# Patient Record
Sex: FEMALE | Race: WHITE | Employment: UNEMPLOYED | URBAN - METROPOLITAN AREA
[De-identification: names, ages, dates, MRNs, and addresses within clinical notes are randomized per-mention and may not be internally consistent; named-entity substitution may affect disease eponyms.]

---

## 2022-02-22 ENCOUNTER — TELEPHONE (OUTPATIENT)
Dept: ORTHOPEDIC SURGERY | Age: 32
End: 2022-02-22

## 2022-04-15 ENCOUNTER — ANESTHESIA EVENT (OUTPATIENT)
Dept: SURGERY | Age: 32
End: 2022-04-15
Payer: MEDICAID

## 2022-04-15 NOTE — PERIOP NOTES
Unable to get a hold of pt. The number on file and with [address] [place] [address] is a [address] of business and the woman who answered said no one here by that name. [address] [place] [address] is aware and a message is at the office to let the pt know she should arrive at 11:45 am if she happens to call the office.

## 2022-04-18 ENCOUNTER — HOSPITAL ENCOUNTER (OUTPATIENT)
Age: 32
Setting detail: OUTPATIENT SURGERY
Discharge: HOME | End: 2022-04-18
Attending: ORTHOPAEDIC SURGERY | Admitting: ORTHOPAEDIC SURGERY
Payer: MEDICAID

## 2022-04-18 ENCOUNTER — ANESTHESIA (OUTPATIENT)
Dept: SURGERY | Age: 32
End: 2022-04-18
Payer: MEDICAID

## 2022-04-18 VITALS
BODY MASS INDEX: 19.21 KG/M2 | OXYGEN SATURATION: 99 % | TEMPERATURE: 97.9 F | HEART RATE: 78 BPM | DIASTOLIC BLOOD PRESSURE: 60 MMHG | WEIGHT: 115.3 LBS | RESPIRATION RATE: 15 BRPM | HEIGHT: 65 IN | SYSTOLIC BLOOD PRESSURE: 105 MMHG

## 2022-04-18 DIAGNOSIS — I99.9 COCAINE-INDUCED VASCULAR DISORDER (HCC): ICD-10-CM

## 2022-04-18 DIAGNOSIS — I99.8 ISCHEMIC FINGER: Primary | ICD-10-CM

## 2022-04-18 DIAGNOSIS — F14.988 COCAINE-INDUCED VASCULAR DISORDER (HCC): ICD-10-CM

## 2022-04-18 LAB — HCG UR QL: NEGATIVE

## 2022-04-18 PROCEDURE — 76030000001 HC AMB SURG OR TIME 1 TO 1.5: Performed by: ORTHOPAEDIC SURGERY

## 2022-04-18 PROCEDURE — 77030003601 HC NDL NRV BLK BBMI -A

## 2022-04-18 PROCEDURE — 74011000250 HC RX REV CODE- 250: Performed by: NURSE ANESTHETIST, CERTIFIED REGISTERED

## 2022-04-18 PROCEDURE — 2709999900 HC NON-CHARGEABLE SUPPLY: Performed by: ORTHOPAEDIC SURGERY

## 2022-04-18 PROCEDURE — 76060000062 HC AMB SURG ANES 1 TO 1.5 HR: Performed by: ORTHOPAEDIC SURGERY

## 2022-04-18 PROCEDURE — 74011250636 HC RX REV CODE- 250/636: Performed by: NURSE ANESTHETIST, CERTIFIED REGISTERED

## 2022-04-18 PROCEDURE — 76210000050 HC AMBSU PH II REC 0.5 TO 1 HR: Performed by: ORTHOPAEDIC SURGERY

## 2022-04-18 PROCEDURE — 81025 URINE PREGNANCY TEST: CPT

## 2022-04-18 PROCEDURE — 77030040922 HC BLNKT HYPOTHRM STRY -A

## 2022-04-18 PROCEDURE — 74011250636 HC RX REV CODE- 250/636: Performed by: ANESTHESIOLOGY

## 2022-04-18 PROCEDURE — 77030002916 HC SUT ETHLN J&J -A: Performed by: ORTHOPAEDIC SURGERY

## 2022-04-18 RX ORDER — HYDROMORPHONE HYDROCHLORIDE 1 MG/ML
0.5 INJECTION, SOLUTION INTRAMUSCULAR; INTRAVENOUS; SUBCUTANEOUS
Status: DISCONTINUED | OUTPATIENT
Start: 2022-04-18 | End: 2022-04-18 | Stop reason: HOSPADM

## 2022-04-18 RX ORDER — DEXMEDETOMIDINE HYDROCHLORIDE 100 UG/ML
INJECTION, SOLUTION INTRAVENOUS AS NEEDED
Status: DISCONTINUED | OUTPATIENT
Start: 2022-04-18 | End: 2022-04-18 | Stop reason: HOSPADM

## 2022-04-18 RX ORDER — GLYCOPYRROLATE 0.2 MG/ML
INJECTION INTRAMUSCULAR; INTRAVENOUS AS NEEDED
Status: DISCONTINUED | OUTPATIENT
Start: 2022-04-18 | End: 2022-04-18 | Stop reason: HOSPADM

## 2022-04-18 RX ORDER — ASPIRIN 81 MG/1
81 TABLET ORAL DAILY
COMMUNITY

## 2022-04-18 RX ORDER — ONDANSETRON 2 MG/ML
4 INJECTION INTRAMUSCULAR; INTRAVENOUS AS NEEDED
Status: DISCONTINUED | OUTPATIENT
Start: 2022-04-18 | End: 2022-04-18 | Stop reason: HOSPADM

## 2022-04-18 RX ORDER — SODIUM CHLORIDE, SODIUM LACTATE, POTASSIUM CHLORIDE, CALCIUM CHLORIDE 600; 310; 30; 20 MG/100ML; MG/100ML; MG/100ML; MG/100ML
150 INJECTION, SOLUTION INTRAVENOUS CONTINUOUS
Status: DISCONTINUED | OUTPATIENT
Start: 2022-04-18 | End: 2022-04-18 | Stop reason: HOSPADM

## 2022-04-18 RX ORDER — FENTANYL CITRATE 50 UG/ML
INJECTION, SOLUTION INTRAMUSCULAR; INTRAVENOUS AS NEEDED
Status: DISCONTINUED | OUTPATIENT
Start: 2022-04-18 | End: 2022-04-18 | Stop reason: HOSPADM

## 2022-04-18 RX ORDER — PROPOFOL 10 MG/ML
INJECTION, EMULSION INTRAVENOUS AS NEEDED
Status: DISCONTINUED | OUTPATIENT
Start: 2022-04-18 | End: 2022-04-18 | Stop reason: HOSPADM

## 2022-04-18 RX ORDER — LIDOCAINE HYDROCHLORIDE 10 MG/ML
0.1 INJECTION, SOLUTION EPIDURAL; INFILTRATION; INTRACAUDAL; PERINEURAL AS NEEDED
Status: DISCONTINUED | OUTPATIENT
Start: 2022-04-18 | End: 2022-04-18 | Stop reason: HOSPADM

## 2022-04-18 RX ORDER — SODIUM CHLORIDE, SODIUM LACTATE, POTASSIUM CHLORIDE, CALCIUM CHLORIDE 600; 310; 30; 20 MG/100ML; MG/100ML; MG/100ML; MG/100ML
125 INJECTION, SOLUTION INTRAVENOUS CONTINUOUS
Status: DISCONTINUED | OUTPATIENT
Start: 2022-04-18 | End: 2022-04-18 | Stop reason: HOSPADM

## 2022-04-18 RX ORDER — MIDAZOLAM HYDROCHLORIDE 1 MG/ML
INJECTION, SOLUTION INTRAMUSCULAR; INTRAVENOUS AS NEEDED
Status: DISCONTINUED | OUTPATIENT
Start: 2022-04-18 | End: 2022-04-18 | Stop reason: HOSPADM

## 2022-04-18 RX ORDER — HYDROCODONE BITARTRATE AND ACETAMINOPHEN 5; 325 MG/1; MG/1
1 TABLET ORAL
Qty: 24 TABLET | Refills: 0 | Status: SHIPPED | OUTPATIENT
Start: 2022-04-18 | End: 2022-04-28

## 2022-04-18 RX ORDER — LIDOCAINE HYDROCHLORIDE 20 MG/ML
INJECTION, SOLUTION EPIDURAL; INFILTRATION; INTRACAUDAL; PERINEURAL AS NEEDED
Status: DISCONTINUED | OUTPATIENT
Start: 2022-04-18 | End: 2022-04-18 | Stop reason: HOSPADM

## 2022-04-18 RX ORDER — CEFAZOLIN SODIUM 1 G/3ML
INJECTION, POWDER, FOR SOLUTION INTRAMUSCULAR; INTRAVENOUS AS NEEDED
Status: DISCONTINUED | OUTPATIENT
Start: 2022-04-18 | End: 2022-04-18 | Stop reason: HOSPADM

## 2022-04-18 RX ORDER — DIPHENHYDRAMINE HYDROCHLORIDE 50 MG/ML
12.5 INJECTION, SOLUTION INTRAMUSCULAR; INTRAVENOUS AS NEEDED
Status: DISCONTINUED | OUTPATIENT
Start: 2022-04-18 | End: 2022-04-18 | Stop reason: HOSPADM

## 2022-04-18 RX ORDER — SODIUM CHLORIDE, SODIUM LACTATE, POTASSIUM CHLORIDE, CALCIUM CHLORIDE 600; 310; 30; 20 MG/100ML; MG/100ML; MG/100ML; MG/100ML
INJECTION, SOLUTION INTRAVENOUS
Status: DISCONTINUED | OUTPATIENT
Start: 2022-04-18 | End: 2022-04-18 | Stop reason: HOSPADM

## 2022-04-18 RX ORDER — CLINDAMYCIN HYDROCHLORIDE 300 MG/1
300 CAPSULE ORAL 3 TIMES DAILY
COMMUNITY

## 2022-04-18 RX ORDER — ACETAMINOPHEN AND CODEINE PHOSPHATE 300; 15 MG/1; MG/1
1 TABLET ORAL
COMMUNITY
End: 2022-04-18

## 2022-04-18 RX ORDER — ROPIVACAINE HYDROCHLORIDE 5 MG/ML
INJECTION, SOLUTION EPIDURAL; INFILTRATION; PERINEURAL AS NEEDED
Status: DISCONTINUED | OUTPATIENT
Start: 2022-04-18 | End: 2022-04-18 | Stop reason: HOSPADM

## 2022-04-18 RX ORDER — ALBUTEROL SULFATE 0.83 MG/ML
2.5 SOLUTION RESPIRATORY (INHALATION) AS NEEDED
Status: DISCONTINUED | OUTPATIENT
Start: 2022-04-18 | End: 2022-04-18 | Stop reason: HOSPADM

## 2022-04-18 RX ORDER — IBUPROFEN 200 MG
TABLET ORAL
COMMUNITY

## 2022-04-18 RX ORDER — PROPOFOL 10 MG/ML
INJECTION, EMULSION INTRAVENOUS
Status: DISCONTINUED | OUTPATIENT
Start: 2022-04-18 | End: 2022-04-18 | Stop reason: HOSPADM

## 2022-04-18 RX ORDER — ONDANSETRON 8 MG/1
8 TABLET, ORALLY DISINTEGRATING ORAL
Qty: 20 TABLET | Refills: 0 | Status: SHIPPED | OUTPATIENT
Start: 2022-04-18

## 2022-04-18 RX ADMIN — PROPOFOL 50 MG: 10 INJECTION, EMULSION INTRAVENOUS at 14:22

## 2022-04-18 RX ADMIN — DEXMEDETOMIDINE HCL 4 MCG: 100 INJECTION INTRAVENOUS at 14:05

## 2022-04-18 RX ADMIN — DEXMEDETOMIDINE HCL 4 MCG: 100 INJECTION INTRAVENOUS at 14:22

## 2022-04-18 RX ADMIN — MIDAZOLAM 2 MG: 1 INJECTION, SOLUTION INTRAMUSCULAR; INTRAVENOUS at 12:58

## 2022-04-18 RX ADMIN — MIDAZOLAM 2 MG: 1 INJECTION, SOLUTION INTRAMUSCULAR; INTRAVENOUS at 12:57

## 2022-04-18 RX ADMIN — ROPIVACAINE HYDROCHLORIDE 30 ML: 5 INJECTION, SOLUTION EPIDURAL; INFILTRATION; PERINEURAL at 13:03

## 2022-04-18 RX ADMIN — LIDOCAINE HYDROCHLORIDE 20 MG: 20 INJECTION, SOLUTION INTRAVENOUS at 14:05

## 2022-04-18 RX ADMIN — SODIUM CHLORIDE, POTASSIUM CHLORIDE, SODIUM LACTATE AND CALCIUM CHLORIDE: 600; 310; 30; 20 INJECTION, SOLUTION INTRAVENOUS at 13:55

## 2022-04-18 RX ADMIN — PROPOFOL 150 MCG/KG/MIN: 10 INJECTION, EMULSION INTRAVENOUS at 14:05

## 2022-04-18 RX ADMIN — DEXMEDETOMIDINE HCL 4 MCG: 100 INJECTION INTRAVENOUS at 14:14

## 2022-04-18 RX ADMIN — CEFAZOLIN SODIUM 2 G: 1 POWDER, FOR SOLUTION INTRAMUSCULAR; INTRAVENOUS at 14:10

## 2022-04-18 RX ADMIN — GLYCOPYRROLATE 0.1 MG: 0.2 INJECTION INTRAMUSCULAR; INTRAVENOUS at 14:05

## 2022-04-18 RX ADMIN — DEXMEDETOMIDINE HCL 4 MCG: 100 INJECTION INTRAVENOUS at 13:58

## 2022-04-18 RX ADMIN — MIDAZOLAM 2 MG: 1 INJECTION, SOLUTION INTRAMUSCULAR; INTRAVENOUS at 13:58

## 2022-04-18 RX ADMIN — FENTANYL CITRATE 100 MCG: 50 INJECTION, SOLUTION INTRAMUSCULAR; INTRAVENOUS at 12:57

## 2022-04-18 RX ADMIN — PROPOFOL 50 MG: 10 INJECTION, EMULSION INTRAVENOUS at 14:35

## 2022-04-18 NOTE — DISCHARGE INSTRUCTIONS
Upper Extremity Surgery Discharge Instructions  Dr. Vicki [facility] / Angy Adams PA-C    Please take the time to review the following instructions before you leave the hospital and use them as guidelines during your recovery from surgery. If you have any questions, you may contact my office at [phone] or via Pingboard messaging, which is typically the quickest and most direct method. Wound Care / Dressing Change    INDEX FINGER DRESSING: You may remove the finger dressing 3 days after surgery (on Thursday, 04/21/22). The Finger tip can then be covered with a Band-Aid if preferred, but it is not necessary to do so. THUMB DRESSING: Do NOT remove your dressing or get them wet. This will be removed at your first post-operative visit. Showering / Bathing    INDEX FINGER DRESSING: Prior to the bandage coming off, the dressings must remain clean and dry. Three days after surgery, you may let warm soapy run over the finger. Please avoid scrubbing the finger or submerging under water until the skin is fully closed/healed over. THUMB DRESSING: May bathe/shower as long as dressing/splint/cast is kept dry. Sling    You are not required to wear a sling and should do so only as needed for comfort. You may remove your sling once the block wears off, which may be anywhere from 8-48 hrs after surgery. Activity    Please begin using fingers immediately after surgery, working to improve motion of straightening and flexing your fingers several times per day. No lifting with your affected hand. Otherwise, you may proceed with activity as tolerated. No driving until you receive further notice otherwise. Ice and Elevation    Keep your hand elevated continuously for 48 hours after surgery using the pillow provided. Your hand/wrist should always be above the level of your heart. Sleep with your arm elevated to minimize swelling and discomfort.     Continue ice consistently for 48 hours after surgery. After 48 hours, you should ice 3 times per day for 20 minutes at a time for the next 5 days. After 1 week from surgery, you may use ice as needed for pain. Diet    You may advance your regular diet as tolerated. Increase your clear liquid intake for the next 2-3 days. Medications  1. You will be given prescriptions for pain medication, and nausea when you are discharged from the hospital. Please use the medications as prescribed. Pain medications may cause constipation - over the counter Colace or Milk of Magnesia may be used as needed. Other possible side effects of pain medications are dizziness, headache, nausea, vomiting, and urinary retention. Discontinue the pain medication if you develop itching, rash, shortness of breath, or difficulties swallowing. If these symptoms become severe or arent relieved by discontinuing the medication, you should seek immediate medical attention. 2. Refills of pain medication are authorized during office hours only (8AM - 5PM Monday through Friday)  3. If you pain medication prescribed at the time of surgery contains Tylenol/Acetaminophen, DO NOT TAKE additional Tylenol/Acetaminophen. Do not exceed 4000mg of Tylenol/Acetaminophen per day. 4. You may resume the medication you were taking prior to your surgery. Pain medication may change the effects of any antidepressant medication you may be taking. If you have any questions about possible interactions between your regular medication and the pain medication, you should consult the physician who prescribes your regular medications. 5. Do not drive until further notice. 6. You were prescribed a nausea medication. It is only necessary to fill this if you are experiencing nausea. Please call the office at [phone] if you have any increasing numbness or tingling, increasing drainage on your dressing, fever greater than 100.5 degrees F or pain not controlled by medications.   If you are experiencing chest pain or shortness of breath, please alert your primary care physician immediately. DISCHARGE SUMMARY from your Nurse      PATIENT INSTRUCTIONS    After general anesthesia or intravenous sedation, for 24 hours or while taking prescription Narcotics:  · Limit your activities  · Do not drive and operate hazardous machinery  · Do not make important personal or business decisions  · Do  not drink alcoholic beverages  · If you have not urinated within 8 hours after discharge, please contact your surgeon on call. Report the following to your surgeon:  · Excessive pain, swelling, redness or odor of or around the surgical area  · Temperature over 100.5  · Nausea and vomiting lasting longer than 4 hours or if unable to take medications  · Any signs of decreased circulation or nerve impairment to extremity: change in color, persistent  numbness, tingling, coldness or increase pain  · Any questions      GOOD HELP TO FIGHT AN INFECTION  Here are a few tip to help reduce the chance of getting an infection after surgery:   Wash Your Hands   Good handwashing is the most important thing you and your caregiver can do.  Wash before and after caring for any wounds. Dry your hand with a clean towel.  Wash with soap and water for at least 20 seconds. A TIP: sing the \"Happy Birthday\" song through one time while washing to help with the timing.  Use a hand  in between washings.  Shower   When your surgeon says it is OK to take a shower, use a new bar of antibacterial soap (if that is what you use, and keep that bar of soap ONLY for your use), or antibacterial body wash.  Use a clean wash cloth or sponge when you bathe.  Dry off with a clean towel  after every bath - be careful around any wounds, skin staples, sutures or surgical glue over/on wounds.    Do not enter swimming pools, hot tubs, lakes, rivers and/or ocean until wounds are healed and your doctor/surgeon says it is OK.     Use Clean Sheets   Sleep on freshly laundered sheets after your surgery.  Keep the surgery site covered with a clean, dry bandage (if instructed to do so). If the bandage becomes soiled, reapply a new, dry, clean bandage.  Do not allow pets to sleep with you while your wound is healing.  Lifestyle Modification and Controlling Your Blood Sugar   Smoking slows wound healing. Stop smoking and limit exposure to second-hand smoke.  High blood sugar slows wound healing. Eat a well-balanced diet to provide proper nutrition while healing   Monitor your blood sugar (if you are a diabetic) and take your medications as you are suppose to so you can control you blood sugar after surgery. COUGH AND DEEP BREATHE    Breathing deeply and coughing are very important exercises to do after surgery. Deep breathing and coughing open the little air tubes and air sacks in your lungs. You take deep breaths every day. You may not even notice - it is just something you do when you sigh or yawn. It is a natural exercise you do to keep these air passages open. After surgery, take deep breaths and cough, on purpose. DIRECTIONS:  · Take 10 to 15 slow deep breaths every hour while awake. · Breathe in deeply, and hold it for 2 seconds. · Exhale slowly through puckered lips, like blowing up a balloon. · After every 4th or 5th deep breath, hug your pillow to your chest or belly and give a hard, deep cough. Yes, it will probably hurt. But doing this exercise is a very important part of healing after surgery. Take your pain medicine to help you do this exercise without too much pain. Coughing and deep breathing help prevent bronchitis and pneumonia after surgery. If you had chest or belly surgery, use a pillow as a \"hug karely\" and hold it tightly to your chest or belly when you cough.        ANKLE PUMPS    Ankle pumps increase the circulation of oxygenated blood to your lower extremities and decrease your risk for circulation problems such as blood clots. They also stretch the muscles, tendons and ligaments in your foot and ankle, and prevent joint contracture in the ankle and foot, especially after surgeries on the legs. It is important to do ankle pump exercises regularly after surgery because immobility increases your risk for developing a blood clot. Your doctor may also have you take an Aspirin for the next few days as well. If your doctor did not ask you to take an Aspirin, consult with him before starting Aspirin therapy on your own. The exercise is quite simple. · Slowly point your foot forward, feeling the muscles on the top of your lower leg stretch, and hold this position for 5 seconds. · Next, pull your foot back toward you as far as possible, stretching the calf muscles, and hold that position for 5 seconds. · Repeat with the other foot. · Perform 10 repetitions every hour while awake for both ankles if possible (down and then up with the foot once is one repetition). You should feel gentle stretching of the muscles in your lower leg when doing this exercise. If you feel pain, or your range of motion is limited, don't push too hard. Only go the limit your joint and muscles will let you go. If you have increasing pain, progressively worsening leg warmth or swelling, STOP the exercise and call your doctor. These are general instructions for a healthy lifestyle:    *   Please give a list of your current medications to your Primary Care Provider. *   Please update this list whenever your medications are discontinued, doses are changed, or new medications (including over-the-counter products) are added. *   Please carry medication information at all times in case of emergency situations.       About Smoking  No smoking / No tobacco products  Avoid exposure to second hand smoke     Surgeon General's Warning:  Quitting smoking now greatly reduces serious risk to your health. Obesity, smoking, and sedentary lifestyle greatly increases your risk for illness and disease. A healthy diet, regular physical exercise & weight monitoring are important for maintaining a healthy lifestyle. Congestive Heart Failure  You may be retaining fluid if you have a history of heart failure or if you experience any of the following symptoms:  Weight gain of 3 pounds or more overnight or 5 pounds in a week, increased swelling in your hands or feet or shortness of breath while lying flat in bed. Please call your doctor as soon as you notice any of these symptoms; do not wait until your next office visit. Recognize signs and symptoms of STROKE:  F -  Face looks uneven  A -  Arms unable to move or move evenly  S -  Speech slurred or non-existent  T -  Time-call 911 as soon as signs and symptoms begin-DO NOT go          back to bed or wait to see if you get better-TIME IS BRAIN. Warning Signs of HEART ATTACK   Call 911 if you have these symptoms:     Chest discomfort. Most heart attacks involve discomfort in the center of the chest that lasts more than a few minutes, or that goes away and comes back. It can feel like uncomfortable pressure, squeezing, fullness, or pain.  Discomfort in other areas of the upper body. Symptoms can include pain or discomfort in one or both arms, the back, neck, jaw, or stomach.  Shortness of breath with or without chest discomfort.  Other signs may include breaking out in a cold sweat, nausea, or lightheadedness. Don't wait more than five minutes to call 911 - MINUTES MATTER! Fast action can save your life. Calling 911 is almost always the fastest way to get lifesaving treatment. Emergency Medical Services staff can begin treatment when they arrive -- up to an hour sooner than if someone gets to the hospital by car.        Learning About Coronavirus [phone])  Coronavirus [phone]): Overview  What is coronavirus (MIVRD-59)? The coronavirus disease (COVID-19) is caused by a virus. It is an illness that was first found in [place], [place], in December 2019. It has since spread worldwide. The virus can cause fever, cough, and trouble breathing. In severe cases, it can cause pneumonia and make it hard to breathe without help. It can cause death. Coronaviruses are a large group of viruses. They cause the common cold. They also cause more serious illnesses like Middle East respiratory syndrome (MERS) and severe acute respiratory syndrome (SARS). COVID-19 is caused by a novel coronavirus. That means it's a new type that has not been seen in people before. This virus spreads person-to-person through droplets from coughing and sneezing. It can also spread when you are close to someone who is infected. And it can spread when you touch something that has the virus on it, such as a doorknob or a tabletop. What can you do to protect yourself from coronavirus (COVID-19)? The best way to protect yourself from getting sick is to:  · Avoid areas where there is an outbreak. · Avoid contact with people who may be infected. · Wash your hands often with soap or alcohol-based hand sanitizers. · Avoid crowds and try to stay at least 6 feet away from other people. · Wash your hands often, especially after you cough or sneeze. Use soap and water, and scrub for at least 20 seconds. If soap and water aren't available, use an alcohol-based hand . · Avoid touching your mouth, nose, and eyes. What can you do to avoid spreading the virus to others? To help avoid spreading the virus to others:  · Cover your mouth with a tissue when you cough or sneeze. Then throw the tissue in the trash. · Use a disinfectant to clean things that you touch often. · Stay home if you are sick or have been exposed to the virus. Don't go to school, work, or public areas. And don't use public transportation.   · If you are sick:  ? Leave your home only if you need to get medical care. But call the doctor's office first so they know you're coming. And wear a face mask, if you have one.  ? If you have a face mask, wear it whenever you're around other people. It can help stop the spread of the virus when you cough or sneeze. ? Clean and disinfect your home every day. Use household  and disinfectant wipes or sprays. Take special care to clean things that you grab with your hands. These include doorknobs, remote controls, phones, and handles on your refrigerator and microwave. And don't forget countertops, tabletops, bathrooms, and computer keyboards. When to call for help  Call 911 anytime you think you may need emergency care. For example, call if:  · You have severe trouble breathing. (You can't talk at all.)  · You have constant chest pain or pressure. · You are severely dizzy or lightheaded. · You are confused or can't think clearly. · Your face and lips have a blue color. · You pass out (lose consciousness) or are very hard to wake up. Call your doctor now if you develop symptoms such as:  · Shortness of breath. · Fever. · Cough. If you need to get care, call ahead to the doctor's office for instructions before you go. Make sure you wear a face mask, if you have one, to prevent exposing other people to the virus. Where can you get the latest information? The following health organizations are tracking and studying this virus. Their websites contain the most up-to-date information. Deedee Cadet also learn what to do if you think you may have been exposed to the virus. · U.S. Centers for Disease Control and Prevention (CDC): The CDC provides updated news about the disease and travel advice. The website also tells you how to prevent the spread of infection. www.cdc.gov  · World Health Organization [facility]): WHO offers information about the virus outbreaks.  WHO also has travel advice. www.who.int  Current as of: April 1, 2020               Content Version: 12.4  © 1494-5931 [employer]. Care instructions adapted under license by your healthcare professional. If you have questions about a medical condition or this instruction, always ask your healthcare professional. [address] any warranty or liability for your use of this information. The discharge information has been reviewed with the {PATIENT PARENT GUARDIAN:65279}. Any questions and concerns from the {PATIENT PARENT GUARDIAN:58240} have been addressed. The {PATIENT PARENT GUARDIAN:71714} verbalized understanding. CONTENTS FOUND IN YOUR DISCHARGE ENVELOPE:  [x]     Surgeon and Hospital Discharge Instructions  [x]     [facility] Surgical Services Care Provider Card  []     Medication & Side Effect Guide            (your newly prescribed medications have been marked/highlighted showing the most common side effects from   the classes of drugs on your prescriptions)  []     Medication Prescription(s) x *** ( [] These have been sent electronically to your pharmacy by your surgeon,   - OR -       your surgeon has already provided these to you during a previous/pre-op office visit)  []     [address]  []     Physical Therapy Prescription  []     Follow-up Appointment Cards  []     Surgery-related Pictures/Media  [x]     Pain block and/or block with On-Q Catheter from Anesthesia Service (information included in your instructions above)  []     Medical device information sheets/pamphlets from their    []     School/work excuse note. []     /parent work excuse note. The following personal items collected during your admission are returned to you:   Dental Appliance: Dental Appliances: (P) None  Vision:    Hearing Aid:    Jewelry: Jewelry: (P) None  Clothing: Clothing: (P) Undergarments,Footwear,Shirt,Socks  Other Valuables:  Other Valuables: (P) None  Valuables sent to safe:            Going Home After A  Peripheral Nerve Block    Patient Information    The anesthesiology team has provided for your pain control through a technique called regional anesthesia. As the name implies, anesthesia (decreased or no pain, sensation, or motor control) has been provided to a specific region, whether that be an arm or a leg. How does this happen?  you might ask. While you were sleepy, the anesthesia provider provided medicine to a specific group of nerves either in the neck/shoulder region or in the groin and/or buttock region, similar to the way a dentist might numb a tooth (teeth.)  They typically use an ultrasound machine to know where the medicine is placed in relation to the nerves they wish to numb up or block.   What this means is that for the next few hours, you should expect to have a numb limb. Below are some things we wish for you to read and be familiar with concerning your anesthetized limb. Caring For a Blocked Body Part    General Considerations:   The numbness may last up to 24 hours   You must protect your arm or leg. The blocked extremity is numb, weak, and difficult for your brain to locate and communicate with. To do this you should:  o Pay attention to the position of the blocked limb at all times. o Be very careful when placing hot or cod items on a numb limb. You could cause tissue damage like burns or frostbite if you are unable to feel temperature. Carefully follow your discharge instructions regarding the use of these therapies in you post-operative care. o Carefully pad the affected limb. Normally we continually move and adjust the position of our bodies without thinking about it. This movement and continuous repositioning helps to prevent injuries from immobility. When a limb is numb it still requires this care  o Be extremely careful not to bump or hit the numb body part. This can result in an unrecognized injury, at lease until the blocked limb wakes up.   It can also result in worse pain of your already post-surgical limb. Arm/Shoulder Blocks:   You may experience a droopy eyelid, nasal stuffiness, and redness of the eye after receiving an arm/shoulder block. This is called Wards Syndrome, and is very common. There is no need for concern. You may also experience mild hoarseness, but all of these symptoms should resolve within 24 hours.  Some patients may experience mild shortness of breath. A sitting position will help alleviate this and it should resolve within 24 hours. If you experience significant or progressive worsening of the shortness of breath, seek medical attention immediately. Knee/Foot Blocks:   DO NOT use the blocked leg to walk, balance or support yourself. Your leg will not be able to balance your weight properly while any part of your leg is numb. You are at a RISK for [address].  Be careful not to drag your foot along the ground or stub your toes. Contact Phone Numbers    CALL 911 IN CASE OF AN EMERGENCY. For all other non-emergency inquiries call the [facility]  at [phone] and ask for the anesthesiologist on call to be paged.

## 2022-04-18 NOTE — PERIOP NOTES
Discharge instructions given via telephone by Michelle Blank of [facility].

## 2022-04-18 NOTE — BRIEF OP NOTE
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 Procedure(s):  LEFT THUMB PARTIAL AMPUTATION (REGIONAL BLOCK WITH MAC)    Surgeon(s):  Courtney Villaseñor MD    Surgical Assistant: Physician Assistant: Dereck Pacheco PA-C    Anesthesia: Regional     Estimated Blood Loss (mL): Minimal    Complications: None    Specimens:   ID Type Source Tests Collected by Time Destination   1 : Left Thumb Necrotic Tissue Tissue Thumb  Courtney Villaseñor MD 4/18/2022 1428 Discarded        Implants: * No implants in log *    Drains: * No LDAs found *    Findings: As above.      Electronically Signed by Eligio Andre PA-C on 4/18/2022 at 3:09 PM

## 2022-04-18 NOTE — ANESTHESIA PROCEDURE NOTES
Peripheral Block    Start time: 4/18/2022 12:57 PM  End time: 4/18/2022 1:03 PM  Performed by: Ana Blanton MD  Authorized by: Ana Blanton MD       Pre-procedure:    Indications: at surgeon's request and primary anesthetic    Preanesthetic Checklist: patient identified, risks and benefits discussed, site marked, timeout performed, anesthesia consent given and patient being monitored    Timeout Time: 12:57 EDT          Block Type:   Block Type:  Supraclavicular  Laterality:  Left  Monitoring:  Continuous pulse ox, frequent vital sign checks, heart rate, responsive to questions and oxygen  Injection Technique:  Single shot  Procedures: ultrasound guided    Patient Position: supine  Prep: chlorhexidine    Location:  Supraclavicular  Needle Type:  Stimuplex  Needle Gauge:  22 G  Needle Localization:  Anatomical landmarks and ultrasound guidance    Assessment:  Number of attempts:  1  Injection Assessment:  Incremental injection every 5 mL, local visualized surrounding nerve on ultrasound, negative aspiration for blood, no paresthesia and no intravascular symptoms  Patient tolerance:  Patient tolerated the procedure well with no immediate complications

## 2022-04-18 NOTE — ANESTHESIA PREPROCEDURE EVALUATION
Relevant Problems   No relevant active problems       Anesthetic History   No history of anesthetic complications            Review of Systems / Medical History  Patient summary reviewed and pertinent labs reviewed    Pulmonary          Smoker         Neuro/Psych   Within defined limits        Pertinent negatives: No seizures, TIA and CVA   Cardiovascular                Pertinent negatives: No past MI, angina and CHF  Exercise tolerance: >4 METS     GI/Hepatic/Renal           Liver disease  Pertinent negatives: No renal disease   Endo/Other  Within defined limits        Pertinent negatives: No diabetes   Other Findings   Comments: HCV  Quit smoking about 2 months ago at time of incarceration           Physical Exam    Airway  Mallampati: II  TM Distance: 4 - 6 cm  Neck ROM: normal range of motion   Mouth opening: Normal     Cardiovascular      Rate: normal         Dental    Dentition: Poor dentition  Comments: Poor dentition throughout. Multiple chipped teeth, denies loose teeth.     Pulmonary  Breath sounds clear to auscultation               Abdominal  GI exam deferred       Other Findings            Anesthetic Plan    ASA: 2  Anesthesia type: regional - supraclavicular block          Induction: Intravenous  Anesthetic plan and risks discussed with: Patient

## 2022-04-18 NOTE — ANESTHESIA POSTPROCEDURE EVALUATION
Procedure(s):  LEFT THUMB PARTIAL AMPUTATION (REGIONAL BLOCK WITH MAC). regional    Anesthesia Post Evaluation      Multimodal analgesia: multimodal analgesia used between 6 hours prior to anesthesia start to PACU discharge  Patient location during evaluation: PACU  Patient participation: complete - patient participated  Level of consciousness: sleepy but conscious  Pain management: adequate  Airway patency: patent  Anesthetic complications: no  Cardiovascular status: acceptable and stable  Respiratory status: acceptable and unassisted  Hydration status: acceptable  Comments: The patient was seen and evaluated in the post-operative period. The time of my evaluation may not match the time of this note. The patient denied uncontrolled pain or nausea, and there were no significant complications evident. Jon Goddard MD      Post anesthesia nausea and vomiting:  none  Final Post Anesthesia Temperature Assessment:  Normothermia (36.0-37.5 degrees C)      INITIAL Post-op Vital signs:   Vitals Value Taken Time   BP 90/50 04/18/22 1512   Temp 36.4 °C (97.6 °F) 04/18/22 1512   Pulse 77 04/18/22 1512   Resp 19 04/18/22 1512   SpO2 95 % 04/18/22 1512   Vitals shown include unvalidated device data.

## 2022-04-18 NOTE — H&P
Orthopedic Admission History and Physical        NAME: Sbara Luna       :  1990       MRN:  [id number]      Subjective:     Patient is a 28 y.o. female who presents with history of Left thumb ischemia of finger. Presents today for surgical treatment. Patient Active Problem List    Diagnosis Date Noted    Ischemic finger 2022    Cocaine-induced vascular disorder ([facility] [address] 2022     Past Medical History:   Diagnosis Date    Hepatitis C       Past Surgical History:   Procedure Laterality Date    HX OTHER SURGICAL      \"cellulitis surgery\"      Prior to Admission medications    Medication Sig Start Date End Date Taking? Authorizing Provider   acetaminophen-codeine (TYLENOL #2) 300-15 mg tab Take 1 Tablet by mouth every four (4) hours as needed. Yes Provider, Historical   aspirin delayed-release 81 mg tablet Take 81 mg by mouth daily. Yes Provider, Historical   ibuprofen (MOTRIN) 200 mg tablet Take  by mouth. Yes Provider, Historical   OTHER Anxiety medication   Yes Provider, Historical   clindamycin (CLEOCIN) 300 mg capsule Take 300 mg by mouth three (3) times daily. Yes Provider, Historical     Current Facility-Administered Medications   Medication Dose Route Frequency    lactated Ringers infusion  150 mL/hr IntraVENous CONTINUOUS    lidocaine (PF) (XYLOCAINE) 10 mg/mL (1 %) injection 0.1 mL  0.1 mL SubCUTAneous PRN    ceFAZolin (ANCEF) 2 g in sterile water (preservative free) 20 mL IV syringe  2 g IntraVENous ONCE      No Known Allergies   Social History     Tobacco Use    Smoking status: Not on file    Smokeless tobacco: Not on file   Substance Use Topics    Alcohol use: Not on file      No family history on file. Review of Systems  A comprehensive review of systems was negative except for that written in the HPI. Objective:     No data found. No data recorded.       Physical Exam:  General appearance: alert, cooperative, no distress, appears stated age  Lungs: No use of accessory breathing muscles. Breathing unlabored. Cardiac: Regular rate. Abdomen: soft, non-tender, non-distended  Extremities: As per prior exam.     Labs: No results found for this or any previous visit (from the past 24 hour(s)). Assessment:   No medical contraindications to proceeding with planned surgery. Please see initial office note for full discussion of risks, benefits, and alternatives to surgery. Patient Active Problem List    Diagnosis Date Noted    Ischemic finger 04/18/2022    Cocaine-induced vascular disorder ([facility] [address] 04/18/2022         Plan:   Proceed with surgery  Pt. stable  Pt.  NPO x meds

## 2022-04-19 NOTE — OP NOTES
[facility]  OPERATIVE REPORT    Name:  Paula Knight  MR#:  [id number]  :  1990  ACCOUNT #:  [de-identified]  DATE OF SERVICE:  2022    PREOPERATIVE DIAGNOSIS:  Left thumb ischemia. POSTOPERATIVE DIAGNOSIS:  Left thumb ischemia. PROCEDURE PERFORMED:  Left thumb revision amputation of V-Y advancement flap. SURGEON:  Florentino Murray MD    ASSISTANT:  Eve Hernandez PA-C. Indication for need for an assistant: An assistant was required during this procedure to help with retraction, exposure, and application of the postoperative dressing. ANESTHESIA:  Regional.    COMPLICATIONS:  None. SPECIMENS REMOVED:  None. IMPLANTS:  None. ESTIMATED BLOOD LOSS:  Minimal.    FINDINGS:  Ischemia of the left thumb. There was about 7-mm nonviable bone that was visible after exposure of the nonviable soft tissue coverage. INDICATION FOR THE PROCEDURE:  The patient is a 26-year-old female who developed ischemia to her left thumb after an injection related injury. She presented to the office for definitive treatment after the tip of the thumb had progressed to gangrene. I discussed treatment options and she elected to proceed, understanding the risk of bleeding, infection, damage to surrounding nerves and blood vessels, persistent pain, stiffness, loss of function, failure to heal, persistent deformity particularly possibility of nail deformity in the setting of loss of bone. She signed informed consent. PROCEDURE:  The patient was seen and identified in the preanesthesia care unit. The operative site was marked. Preoperative questions were invited and answered. The patient was evaluated by the Anesthesia team and a brachial plexus block was placed. She was brought to the operative suite on a stretcher. She was transferred to the operating room table. She was prepped and draped in the usual fashion.   A time-out was undertaken confirming the appropriate site, side and procedure. She received antibiotics prior to proceeding. Next, a [place] Redder was used at the junction of the eschar and the viable skin and the soft tissue dissection proceeded bluntly down to the bone and there was an easily discernible plane between viable and nonviable tissue. A [place] elevator was used to remove the nail plate in order to facilitate this dorsally. Then, after the bone was exposed, the nonviable tissue was removed off the tip of the bone and to expose the underlying bone and this was clearly nonviable over the exposed portion. This was about 7 mm of bone from the tip of the bone. This was removed until only healthy bleeding cancellous bone was available. There was still exposed bone in the defect and as such, direct closure was not possible, so the VY advancement flap was determined. Given the oblique nature of the defect, this was not a true transverse flap but given the direction of the obliquity, a Kutler flap was not suitable, so a volar VY flap was created volar. The incision was made stopping proximally at the IP flexion crease. Neurovascular bundles were identified and protected and the remaining pulp tissue was divided on either side of the flap in order to facilitate translation of the flap distally. This was done without any tension and the flap was approximated nicely to the sterile matrix of the thumb using 4-0 chromic sutures where the tension was repaired. The sides of the VY flap were secured with 4-0 chromic interrupted sutures though the longitudinal and proximal axis of the flap was left open. A sterile dressing was applied. This was done without tourniquet. The patient was allowed to emerge from anesthesia and did so uneventfully. POSTOPERATIVE PLAN:  Return to clinic in one week for wound check and transition to a removable splint at that time.       MD JAN [id number]  D:  04/18/2022 15:23  T:  04/19/2022 0:05  JOB #: [id number]

## (undated) DEVICE — GLOVE SURG SZ 8 CRM LTX FREE POLYISOPRENE POLYMER BEAD ANTI

## (undated) DEVICE — GLOVE SURG SZ 75 L12IN FNGR THK79MIL GRN LTX FREE

## (undated) DEVICE — SOL IRRIGATION INJ NACL 0.9% 500ML BTL

## (undated) DEVICE — SUTURE ETHLN SZ 4-0 L18IN NONABSORBABLE BLK L19MM PS-2 3/8 1667H

## (undated) DEVICE — GLOVE SURG SZ 85 L12IN FNGR THK79MIL GRN LTX FREE

## (undated) DEVICE — GLOVE ORANGE PI 7   MSG9070

## (undated) DEVICE — DRAPE,U/ SHT,SPLIT,PLAS,STERIL: Brand: MEDLINE

## (undated) DEVICE — SKIN PREP TRAY W/CHG: Brand: MEDLINE INDUSTRIES, INC.

## (undated) DEVICE — HAND-SFMCASU: Brand: MEDLINE INDUSTRIES, INC.

## (undated) DEVICE — DRESSING,GAUZE,XEROFORM,CURAD,1"X8",ST: Brand: CURAD

## (undated) DEVICE — WRAP COHESIVE W2INXL5YD TAN SELF ADH BNDG HND NON STERILE TEAR CARING